# Patient Record
Sex: FEMALE | Race: WHITE
[De-identification: names, ages, dates, MRNs, and addresses within clinical notes are randomized per-mention and may not be internally consistent; named-entity substitution may affect disease eponyms.]

---

## 2023-09-14 ENCOUNTER — HOSPITAL ENCOUNTER (OUTPATIENT)
Dept: HOSPITAL 33 - SDC | Age: 53
Discharge: HOME | End: 2023-09-14
Attending: SURGERY
Payer: COMMERCIAL

## 2023-09-14 VITALS — HEART RATE: 72 BPM | DIASTOLIC BLOOD PRESSURE: 78 MMHG | SYSTOLIC BLOOD PRESSURE: 148 MMHG

## 2023-09-14 VITALS — TEMPERATURE: 98.7 F | OXYGEN SATURATION: 99 %

## 2023-09-14 VITALS — RESPIRATION RATE: 18 BRPM

## 2023-09-14 DIAGNOSIS — Z86.010: ICD-10-CM

## 2023-09-14 DIAGNOSIS — Z09: Primary | ICD-10-CM

## 2023-09-14 DIAGNOSIS — K62.1: ICD-10-CM

## 2023-09-14 DIAGNOSIS — K64.8: ICD-10-CM

## 2023-09-14 DIAGNOSIS — Z80.0: ICD-10-CM

## 2023-09-14 NOTE — OP
SURGERY DATE/TIME:  09/14/2023   0842



PREOPERATIVE DIAGNOSIS:     Follow up polyps, family history of colon cancer.



POSTOPERATIVE DIAGNOSES: 

1) Three rectal polyps one jar. 

2) Moderate internal hemorrhoids. 



PROCEDURE:  Colonoscopy complete to cecum with hot polypectomy rectum x3 submitted in one 
jar. 



SURGEON:        Moiz Tinajero M.D.



ASSISTANT:         Cb Nunez M.D., [facility] Resident III.



ANESTHESIA:    General.



COMPLICATIONS:    None.



CONDITION:        Stable.



INDICATION:  The patient was brought in for follow up. She has a family history of colon 
cancer and a personal history of polyps. She is about [age] years at this time. 



DESCRIPTION OF PROCEDURE:  She is taken to endoscopy. Left lateral decubitus position Anal 
digital examination satisfactory. Scope introduced. There was moderate internal 
hemorrhoids. The rectum was normal. The scope advanced to the cecum. Base of the cecum, 
ileocecal valve, appendiceal orifice normal. Ascending, hepatic, transverse, splenic, 
descending, sigmoid all normal. In the upper rectum there are three polyps taken with hot 
biopsy forceps and submitted in one container. Anticipated follow up three to five years.